# Patient Record
Sex: FEMALE | Race: WHITE | URBAN - METROPOLITAN AREA
[De-identification: names, ages, dates, MRNs, and addresses within clinical notes are randomized per-mention and may not be internally consistent; named-entity substitution may affect disease eponyms.]

---

## 2024-09-30 ENCOUNTER — TELEPHONE (OUTPATIENT)
Age: 69
End: 2024-09-30

## 2024-10-01 ENCOUNTER — OFFICE VISIT (OUTPATIENT)
Dept: PODIATRY | Facility: CLINIC | Age: 69
End: 2024-10-01
Payer: MEDICARE

## 2024-10-01 VITALS
WEIGHT: 281 LBS | HEIGHT: 70 IN | BODY MASS INDEX: 40.23 KG/M2 | SYSTOLIC BLOOD PRESSURE: 130 MMHG | DIASTOLIC BLOOD PRESSURE: 97 MMHG | HEART RATE: 121 BPM

## 2024-10-01 DIAGNOSIS — E11.9 CONTROLLED TYPE 2 DIABETES MELLITUS WITHOUT COMPLICATION, WITHOUT LONG-TERM CURRENT USE OF INSULIN (HCC): ICD-10-CM

## 2024-10-01 DIAGNOSIS — B35.1 ONYCHOMYCOSIS: ICD-10-CM

## 2024-10-01 DIAGNOSIS — L85.3 XEROSIS OF SKIN: Primary | ICD-10-CM

## 2024-10-01 PROCEDURE — 11721 DEBRIDE NAIL 6 OR MORE: CPT | Performed by: PODIATRIST

## 2024-10-01 PROCEDURE — RECHECK: Performed by: PODIATRIST

## 2024-10-01 RX ORDER — PAROXETINE 30 MG/1
30 TABLET, FILM COATED ORAL 2 TIMES DAILY
COMMUNITY
Start: 2024-07-19

## 2024-10-01 RX ORDER — LEVOTHYROXINE SODIUM 88 UG/1
88 TABLET ORAL DAILY
COMMUNITY
Start: 2024-09-16

## 2024-10-01 RX ORDER — PRAVASTATIN SODIUM 80 MG/1
TABLET ORAL
COMMUNITY

## 2024-10-01 RX ORDER — AMLODIPINE AND VALSARTAN 10; 160 MG/1; MG/1
1 TABLET ORAL DAILY
COMMUNITY
Start: 2024-09-16

## 2024-10-01 NOTE — PROGRESS NOTES
Ambulatory Visit  Name: Chandan Du      : 1955      MRN: [id number]  Encounter Provider: Varun Quinones DPM  Encounter Date: 10/1/2024   Encounter department: [facility] PODIATRY [place]    Assessment & Plan  Xerosis of skin       Pt was instructed to use lotion once a day on both feet such as cerave, Cetaphil or similar thick style of lotion.   Onychomycosis       Right mycotic nails and thin the nail plates x 6 using a nail nipper manually and an electric Dremel bur was used to reduce the thickness of the nails and smoothed the distal edge of the nail itself.  Patient tolerated the procedure well  Controlled type 2 diabetes mellitus without complication, without long-term current use of insulin ([facility]ly [facility])    Lab Results   Component Value Date    HGBA1C 6.0 (H) 2024              History of Present Illness     Chandan Du is a 69 y.o. adult who presents with chief complaint of painful thick nails on both feet.  He is a diabetic.    History obtained from : patient  Review of Systems  Medical History Reviewed by provider this encounter:       Current Outpatient Medications on File Prior to Visit   Medication Sig Dispense Refill    amLODIPine-valsartan (EXFORGE)  MG per tablet Take 1 tablet by mouth daily      levothyroxine 88 mcg tablet Take 88 mcg by mouth daily      metFORMIN (GLUCOPHAGE) 500 mg tablet Take 500 mg by mouth daily with breakfast      PARoxetine (PAXIL) 30 mg tablet Take 30 mg by mouth 2 (two) times a day      pravastatin (PRAVACHOL) 80 mg tablet take 1 tablet by mouth every day at night       No current facility-administered medications on file prior to visit.      Social History     Tobacco Use    Smoking status: Former     Current packs/day: 0.00     Types: Cigarettes     Quit date: 1986     Years since quittin.4    Smokeless tobacco: Never   Vaping Use    Vaping status: Never Used   Substance and Sexual Activity    Alcohol use: Yes     Comment: Occasionally  "   Drug use: Never    Sexual activity: Not Currently         Objective     /97 (BP Location: Right arm, Patient Position: Sitting, Cuff Size: Large)   Pulse (!) 121   Ht 5' 10\" (1.778 m)   Wt 127 kg (281 lb)   BMI 40.32 kg/m²     Physical Exam  Vascular status is 0/4 PT 1/4 DP negative digital hair normal distal cooling immediate capillary refill bilaterally.  There is slight edema present bilaterally.  The capillary refill is approximately 2 to 3 seconds.    Derm nails are brittle elongated hypertrophic yellow-white discoloration with subungual debris x 6.  There is an increased thickness in the nails of approximately 1 to 2 mm.  There is white flaky tissue present on the plantar aspect and dorsal aspect of both feet with a slight buildup of the white flaky tissue present on the posterior aspects of both heels.  Negative dried blood or fissures are noted in the area.    Neuro is intact and equal bilaterally 0.5 monofilament test was performed and it was intact at all 5 sites.  The sites that were tested were the plantar aspect of the hallux,, and the plantar    Ortho mild hammertoe deformities are present on the fifth digits bilaterally which are in adductovarus position.    Administrative Statements   I have spent a total time of 15 minutes in caring for this patient on the day of the visit/encounter including Risks and benefits of tx options, Instructions for management, Patient and family education, Counseling / Coordination of care, Documenting in the medical record, Reviewing / ordering tests, medicine, procedures  , and Obtaining or reviewing history  .  "

## 2025-01-07 ENCOUNTER — OFFICE VISIT (OUTPATIENT)
Dept: PODIATRY | Facility: CLINIC | Age: 70
End: 2025-01-07
Payer: MEDICARE

## 2025-01-07 VITALS — BODY MASS INDEX: 39.97 KG/M2 | WEIGHT: 279.2 LBS | HEIGHT: 70 IN

## 2025-01-07 DIAGNOSIS — L85.3 XEROSIS OF SKIN: Primary | ICD-10-CM

## 2025-01-07 DIAGNOSIS — E11.9 CONTROLLED TYPE 2 DIABETES MELLITUS WITHOUT COMPLICATION, WITHOUT LONG-TERM CURRENT USE OF INSULIN (HCC): ICD-10-CM

## 2025-01-07 DIAGNOSIS — B35.1 ONYCHOMYCOSIS: ICD-10-CM

## 2025-01-07 DIAGNOSIS — M79.675 PAIN IN TOES OF BOTH FEET: ICD-10-CM

## 2025-01-07 DIAGNOSIS — M79.674 PAIN IN TOES OF BOTH FEET: ICD-10-CM

## 2025-01-07 DIAGNOSIS — I73.9 PERIPHERAL VASCULAR DISEASE (HCC): ICD-10-CM

## 2025-01-07 PROCEDURE — RECHECK: Performed by: PODIATRIST

## 2025-01-07 PROCEDURE — 11721 DEBRIDE NAIL 6 OR MORE: CPT | Performed by: PODIATRIST

## 2025-01-07 NOTE — PROGRESS NOTES
Name: Chandan Du      : 1955      MRN: [id number]  Encounter Provider: Varun Quinones DPM  Encounter Date: 2025   Encounter department: [facility] PODIATRY [place]  :  Assessment & Plan  Xerosis of skin       Pt was instructed to use lotion once a day on both feet such as cerave, Cetaphil or similar thick style of lotion.   It was suggested that he increase his water intake to at least 5 16 ounce bottles.  Onychomycosis       Debride mycotic nails and thin the nail plates x 6 with the use of a nail nipper manually and an electric Dremel bur was used to reduce the thickness of the nail beds and smoothed the distal aspect of the nails.   Controlled type 2 diabetes mellitus without complication, without long-term current use of insulin ([facility])    Lab Results   Component Value Date    HGBA1C 6.0 (H) 2024            Pain in toes of both feet         Peripheral vascular disease (HCC)         Discussed proper shoe gear, daily inspections of feet, and general foot health with patient. Patient has Q8  findings and is recommended for at risk foot care every 9-10 weeks.    Patients most recent complete clinical foot exam was on: 2024      Return in about 10 weeks (around 3/18/2025).     History of Present Illness   HPI  Chandan Du is a 69 y.o. adult who presents with chief complaint of painful thick nails on both feet and dry flaky skin also on both feet.  He is a diabetic.Patient presents for at-risk foot care.  Patient has no acute concerns today.  Patient has significant lower extremity risk due to neuropathy, parasthesia, edema, and trophic skin changes to the lower extremity.   History obtained from: patient    Review of Systems  Medical History Reviewed by provider this encounter:     .  Current Outpatient Medications on File Prior to Visit   Medication Sig Dispense Refill    amLODIPine-valsartan (EXFORGE)  MG per tablet Take 1 tablet by mouth daily      levothyroxine 88 mcg  "tablet Take 88 mcg by mouth daily      metFORMIN (GLUCOPHAGE) 500 mg tablet Take 500 mg by mouth daily with breakfast      PARoxetine (PAXIL) 30 mg tablet Take 30 mg by mouth 2 (two) times a day      pravastatin (PRAVACHOL) 80 mg tablet take 1 tablet by mouth every day at night       No current facility-administered medications on file prior to visit.      Social History     Tobacco Use    Smoking status: Former     Current packs/day: 0.00     Types: Cigarettes     Quit date: 1986     Years since quittin.7    Smokeless tobacco: Never   Vaping Use    Vaping status: Never Used   Substance and Sexual Activity    Alcohol use: Yes     Comment: Occasionally    Drug use: Never    Sexual activity: Not Currently        Objective   Ht 5' 10\" (1.778 m)   Wt 127 kg (279 lb 3.2 oz)   BMI 40.06 kg/m²      Physical Exam  Vascular status is a faint 1/4 DP PT negative digital hair normal distal cooling slightly delayed capillary refill bilaterally.  Capillary refill is approximately 3 to 4 seconds.    Derm nails are brittle elongated hypertrophic white discoloration with subungual debris x 6.  There is an increased thickness and the nails are approximately 1 to 2 mm.  White flaky tissue is present on the dorsal and plantar aspects of both feet with no fissures or dried blood present.  There is dependent rubor noted bilaterally which does not lessen with elevation.    Ortho and neuro exam are unchanged from his last appointment at 10/1/2024    Administrative Statements   I have spent a total time of 15 minutes in caring for this patient on the day of the visit/encounter including Risks and benefits of tx options, Instructions for management, Patient and family education, Importance of tx compliance, Risk factor reductions, Counseling / Coordination of care, Documenting in the medical record, and Obtaining or reviewing history  .   "

## 2025-04-08 ENCOUNTER — OFFICE VISIT (OUTPATIENT)
Dept: PODIATRY | Facility: CLINIC | Age: 70
End: 2025-04-08
Payer: MEDICARE

## 2025-04-08 VITALS — BODY MASS INDEX: 40.09 KG/M2 | WEIGHT: 280 LBS | HEIGHT: 70 IN

## 2025-04-08 DIAGNOSIS — E11.9 CONTROLLED TYPE 2 DIABETES MELLITUS WITHOUT COMPLICATION, WITHOUT LONG-TERM CURRENT USE OF INSULIN (HCC): ICD-10-CM

## 2025-04-08 DIAGNOSIS — B35.1 ONYCHOMYCOSIS: Primary | ICD-10-CM

## 2025-04-08 DIAGNOSIS — I73.9 PERIPHERAL VASCULAR DISEASE (HCC): ICD-10-CM

## 2025-04-08 DIAGNOSIS — M79.674 PAIN IN TOES OF BOTH FEET: ICD-10-CM

## 2025-04-08 DIAGNOSIS — M79.675 PAIN IN TOES OF BOTH FEET: ICD-10-CM

## 2025-04-08 PROCEDURE — 11721 DEBRIDE NAIL 6 OR MORE: CPT | Performed by: PODIATRIST

## 2025-04-08 PROCEDURE — RECHECK: Performed by: PODIATRIST

## 2025-04-08 NOTE — PROGRESS NOTES
"Name: Chandan Du      : 1955      MRN: [id number]  Encounter Provider: Varun Quinones DPM  Encounter Date: 2025   Encounter department: [facility] PODIATRY [place]  :  Assessment & Plan  Onychomycosis       Debride mycotic nails and thin the nail plates x 6 with the use of a nail nipper manually and an electric Dremel bur was used to reduce the thickness of the nail beds and smoothed the distal aspect of the nails.   Controlled type 2 diabetes mellitus without complication, without long-term current use of insulin (HCC)    Lab Results   Component Value Date    HGBA1C 6.2 (H) 2025            Peripheral vascular disease (HCC)         Pain in toes of both feet         Pt was instructed to use lotion once a day on both feet such as cerave, Cetaphil or similar thick style of lotion.   He is to increase the amount of water that he drinks in a day to help with hydration.  Discussed proper shoe gear, daily inspections of feet, and general foot health with patient. Patient has Q8  findings and is recommended for at risk foot care every 9-10 weeks.    Patients most recent complete clinical foot exam was on: 2025     Return in about 3 months (around 2025).     History of Present Illness   HPI  Chandan Du is a 69 y.o. adult who presents with chief complaint of painful thick nails on both feet and dry flaky skin also present on both feet.  He is a diabetic whose last A1c was 6.2 performed on 25.  Patient presents for at-risk foot care.  Patient has no acute concerns today.  Patient has significant lower extremity risk due to neuropathy, parasthesia, edema, and trophic skin changes to the lower extremity.   History obtained from: patient    Review of Systems            Objective   Ht 5' 10\" (1.778 m)   Wt 127 kg (280 lb)   BMI 40.18 kg/m²      Physical Exam  Vascular status is a faint 1/4 DP PT negative digital hair, normal distal cooling, slightly delayed capillary refill " bilaterally.  Capillary refill is approximately 3 to 4 seconds.    Derm nails are brittle elongated hypertrophic white discoloration with subungual debris x 6.  There is an increase thickness in the nails of approximately 1 to 2 mm.  There is white flaky tissue present on the plantar aspect of both feet with numerous areas of skin lifting up.  No deep fissures and no dried blood is present.  The dependent rubor that was noted on last visit is unchanged at this visit.    Neuro is intact and equal bilaterally 0.5 monofilament test was performed and it was intact at all 5 sites.  The sites that were tested were the plantar aspect of the hallux,, and the plantar     Ortho mild hammertoe deformities are present on the fifth digits bilaterally which are in adductovarus position.    Administrative Statements   I have spent a total time of 15 minutes in caring for this patient on the day of the visit/encounter including Risks and benefits of tx options, Instructions for management, Patient and family education, Importance of tx compliance, Risk factor reductions, Counseling / Coordination of care, Documenting in the medical record, and Obtaining or reviewing history  .

## 2025-07-08 ENCOUNTER — PROCEDURE VISIT (OUTPATIENT)
Dept: PODIATRY | Facility: CLINIC | Age: 70
End: 2025-07-08
Payer: MEDICARE

## 2025-07-08 VITALS — BODY MASS INDEX: 40.23 KG/M2 | HEIGHT: 70 IN | WEIGHT: 281 LBS

## 2025-07-08 DIAGNOSIS — M79.674 PAIN IN TOES OF BOTH FEET: ICD-10-CM

## 2025-07-08 DIAGNOSIS — M79.675 PAIN IN TOES OF BOTH FEET: ICD-10-CM

## 2025-07-08 DIAGNOSIS — I73.9 PERIPHERAL VASCULAR DISEASE (HCC): ICD-10-CM

## 2025-07-08 DIAGNOSIS — E11.9 CONTROLLED TYPE 2 DIABETES MELLITUS WITHOUT COMPLICATION, WITHOUT LONG-TERM CURRENT USE OF INSULIN (HCC): ICD-10-CM

## 2025-07-08 DIAGNOSIS — S90.212A CONTUSION OF LEFT GREAT TOE WITH DAMAGE TO NAIL, INITIAL ENCOUNTER: Primary | ICD-10-CM

## 2025-07-08 PROCEDURE — 99212 OFFICE O/P EST SF 10 MIN: CPT | Performed by: PODIATRIST

## 2025-07-08 RX ORDER — PROPRANOLOL HYDROCHLORIDE 10 MG/1
20 TABLET ORAL 3 TIMES DAILY
COMMUNITY

## 2025-07-08 NOTE — PROGRESS NOTES
"Name: Chandan Du      : 1955      MRN: [id number]  Encounter Provider: Varun Quinones DPM  Encounter Date: 2025   Encounter department: [facility] PODIATRY [place]  :  Assessment & Plan  Contusion of left great toe with damage to nail, initial encounter       Explained to the patient that the dark discoloration that is currently present on the nail will get significantly worse as time goes on and then they will be proximal clearing after that.  Patient was informed that the nail may fall off as the new nail is growing behind it.  Patient is to make sure his sneakers fit properly and there is nothing pressing on the toes.  Peripheral vascular disease (HCC)         Controlled type 2 diabetes mellitus without complication, without long-term current use of insulin (HCC)    Lab Results   Component Value Date    HGBA1C 6.2 (H) 2025            Pain in toes of both feet         Reviewed PCPs note from 2025.    Return in about 10 weeks (around 2025).     History of Present Illness   HPI  Chandan Du is a 70 y.o. adult who presents with chief complaint of a blackened toenail on his left big toe.  Patient denies any known trauma to the area.  He does relate that he had been on a almost 3-week excursion/vacation and had walked extensively during the trip.  He thinks maybe that is what called the trauma to the toenail.  He is a diabetic whose last A1c was 6.2 drawn on 2025.  History obtained from: patient    Review of Systems  Medical History Reviewed by provider this encounter:     .  Medications Ordered Prior to Encounter[1]   Social History[2]     Objective   Ht 5' 10\" (1.778 m)   Wt 127 kg (281 lb)   BMI 40.32 kg/m²      Physical Exam  Vascular status is a faint 1/4 DP PT negative digital hair, normal distal cooling, slightly delayed capillary refill bilaterally.  Capillary refill is approximately 3 to 4 seconds.     Derm left hallux nail has a dark discoloration on the " proximal half of the nail.  The discoloration is present within the nail and goes up and under the eponychium.  No proximal clearing is noted.  Currently the nail is well attached to the nailbed.  No signs of any ecchymosis, erythema, or pain upon palpation of the left hallux nail.  There is white flaky tissue present on the dorsal and plantar aspects of both feet with slight discoloration noted with dependency.    Neuro is unchanged from his visit on 2025.       [1]   Current Outpatient Medications on File Prior to Visit   Medication Sig Dispense Refill    amLODIPine-valsartan (EXFORGE)  MG per tablet Take 1 tablet by mouth in the morning.      levothyroxine 88 mcg tablet Take 88 mcg by mouth in the morning.      metFORMIN (GLUCOPHAGE) 500 mg tablet Take 500 mg by mouth daily with breakfast      PARoxetine (PAXIL) 30 mg tablet Take 30 mg by mouth in the morning and 30 mg in the evening.      pravastatin (PRAVACHOL) 80 mg tablet       propranolol (INDERAL) 10 mg tablet Take 20 mg by mouth 3 (three) times a day       No current facility-administered medications on file prior to visit.   [2]   Social History  Tobacco Use    Smoking status: Former     Current packs/day: 0.00     Types: Cigarettes     Quit date: 1986     Years since quittin.2    Smokeless tobacco: Never   Vaping Use    Vaping status: Never Used   Substance and Sexual Activity    Alcohol use: Yes     Comment: Occasionally    Drug use: Never    Sexual activity: Not Currently